# Patient Record
Sex: MALE | Race: WHITE | NOT HISPANIC OR LATINO | Employment: OTHER | URBAN - METROPOLITAN AREA
[De-identification: names, ages, dates, MRNs, and addresses within clinical notes are randomized per-mention and may not be internally consistent; named-entity substitution may affect disease eponyms.]

---

## 2018-01-23 ENCOUNTER — OFFICE VISIT (OUTPATIENT)
Dept: OPHTHALMOLOGY | Facility: CLINIC | Age: 83
End: 2018-01-23
Payer: MEDICARE

## 2018-01-23 DIAGNOSIS — H02.883 MEIBOMIAN GLAND DYSFUNCTION (MGD) OF BOTH EYES: ICD-10-CM

## 2018-01-23 DIAGNOSIS — H52.7 REFRACTIVE ERROR: ICD-10-CM

## 2018-01-23 DIAGNOSIS — H02.886 MEIBOMIAN GLAND DYSFUNCTION (MGD) OF BOTH EYES: ICD-10-CM

## 2018-01-23 DIAGNOSIS — Z96.1 PSEUDOPHAKIA, BOTH EYES: ICD-10-CM

## 2018-01-23 DIAGNOSIS — H26.493 POSTERIOR CAPSULAR OPACIFICATION VISUALLY SIGNIFICANT OF BOTH EYES: Primary | ICD-10-CM

## 2018-01-23 PROCEDURE — 92014 COMPRE OPH EXAM EST PT 1/>: CPT | Mod: S$PBB,,, | Performed by: OPHTHALMOLOGY

## 2018-01-23 PROCEDURE — 99999 PR PBB SHADOW E&M-EST. PATIENT-LVL II: CPT | Mod: PBBFAC,,, | Performed by: OPHTHALMOLOGY

## 2018-01-23 PROCEDURE — 99212 OFFICE O/P EST SF 10 MIN: CPT | Mod: PBBFAC,PO | Performed by: OPHTHALMOLOGY

## 2018-01-23 RX ORDER — NEOMYCIN SULFATE, POLYMYXIN B SULFATE, AND DEXAMETHASONE 3.5; 10000; 1 MG/G; [USP'U]/G; MG/G
OINTMENT OPHTHALMIC NIGHTLY
Qty: 3.5 G | Refills: 0 | Status: SHIPPED | OUTPATIENT
Start: 2018-01-23 | End: 2018-04-17 | Stop reason: ALTCHOICE

## 2018-01-23 RX ORDER — LEVOTHYROXINE SODIUM 25 UG/1
25 TABLET ORAL
COMMUNITY
Start: 2017-12-31

## 2018-01-23 RX ORDER — DOXYCYCLINE 100 MG/1
100 CAPSULE ORAL 2 TIMES DAILY
Qty: 60 CAPSULE | Refills: 11 | Status: SHIPPED | OUTPATIENT
Start: 2018-01-23 | End: 2018-03-09 | Stop reason: ALTCHOICE

## 2018-01-23 RX ORDER — PREDNISOLONE ACETATE 10 MG/ML
1 SUSPENSION/ DROPS OPHTHALMIC 4 TIMES DAILY
Qty: 5 ML | Refills: 0 | Status: SHIPPED | OUTPATIENT
Start: 2018-01-23 | End: 2018-04-17 | Stop reason: ALTCHOICE

## 2018-01-23 NOTE — PATIENT INSTRUCTIONS
What Is YAG Capsulotomy?  YAG capsulotomy is a laser eye treatment. It is used to improve your vision after cataract surgery. Your vision can become cloudy again after cataract surgery. This is sometimes called an after cataract. But it isnt a new cataract. Instead, your posterior capsule, which holds the lens in place, becomes cloudy. Your eye doctor may use YAG capsulotomy to help you see better.      Your eye after cataract surgery  When your cataract is removed, your eyes cloudy lens is replaced with a plastic lens called an IOL (intraocular lens). The IOL is placed in the posterior capsule, which held the old cloudy lens. You can see again because the IOL lets light reach your retina. The retina is a tissue layer that lines the back of your eye.  If the capsule becomes cloudy  The posterior capsule is a thin, clear film. It can become cloudy. This can happen months or even years after cataract surgery. This may block light from reaching your retina.  Date Last Reviewed: 6/13/2015  © 6497-5973 [employer] [address] [place] [address]. All rights reserved. This information is not intended as a substitute for professional medical care. Always follow your healthcare professional's instructions.        YAG Capsulotomy: How a YAG Laser Works    A laser is a strong beam of energy that can be focused on a tiny point. A laser can be precisely controlled. This makes it safe and reliable.  The YAG laser  For a capsulotomy, your eye doctor uses a YAG laser. The YAG laser gives off fast, tiny bursts of energy. A special contact lens may be used to help focus the laser on the right spot. The laser passes through the front of your eye. It also passes through your new IOL (intraocular lens). It doesnt harm them. When the laser reaches your posterior capsule, it makes a tiny opening. Light can then enter your eye again. Your posterior capsule is still able to hold your IOL in place.     Date  Last Reviewed: 6/13/2015  © 7457-3310 [employer]. [address] [place] [address], [place] [address]. All rights reserved. This information is not intended as a substitute for professional medical care. Always follow your healthcare professional's instructions.        YAG Capsulotomy: Your Experience    YAG capsulotomy is done in your eye doctors office or at an outpatient surgery center. The treatment is usually quick and painless. You can most often return to your normal routine right away. There are no needles or stitches. There is no risk of infection. Your vision will most likely be fully restored soon after treatment.  Possible risks  Laser treatment is safe. But all procedures have some risks. The risks of this treatment include:  · Your eye pressure may rise, usually only for a short time.  · The laser can scratch your IOL. But this almost never affects vision.  · In rare cases, the retina may become detached.   Before capsulotomy  Ask your eye doctor if you need to have someone drive you to and from your treatment. Most people see clearly again right away and go home shortly after treatment.  During capsulotomy  First, your eye is numbed and your pupil is widened (dilated) with eye drops. Then, you rest your chin on a  front of the laser machine. You may see flashes of light and hear a faint clicking sound as the laser enters your eye. But you should not feel any pain. You can help by staying relaxed and still.  After capsulotomy  You should begin to see better in a few hours. Your eye doctor may check your eye pressure later that day or the next. He or she may also give you eye drops or an ointment. Once the posterior capsule (the part of the eye that holds the lens in place) has been opened, it wont make your vision cloudy again.  Call your eye doctor right away if you have any of the following:  · Increased pain  · Sudden decrease in vision  · Increased flashing lights or floaters  · A  shadow covering your vision   Date Last Reviewed: 6/13/2015  © 1766-0187 [employer] [address] [place] [address], [place] [address]. All rights reserved. This information is not intended as a substitute for professional medical care. Always follow your healthcare professional's instructions.
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
## 2018-01-29 ENCOUNTER — TELEPHONE (OUTPATIENT)
Dept: OPHTHALMOLOGY | Facility: CLINIC | Age: 83
End: 2018-01-29

## 2018-01-29 NOTE — TELEPHONE ENCOUNTER
----- Message from Casie Valenzuela sent at 1/29/2018 10:14 AM CST -----  Contact: self [phone]  He is calling to let you know that his vision is a little blurry this morning.  Please call him with advice.  Thank you!

## 2018-02-22 ENCOUNTER — PROCEDURE VISIT (OUTPATIENT)
Dept: OPHTHALMOLOGY | Facility: CLINIC | Age: 83
End: 2018-02-22
Attending: OPHTHALMOLOGY
Payer: MEDICARE

## 2018-02-22 DIAGNOSIS — H26.493 POSTERIOR CAPSULAR OPACIFICATION VISUALLY SIGNIFICANT OF BOTH EYES: ICD-10-CM

## 2018-02-22 DIAGNOSIS — Z96.1 PSEUDOPHAKIA, BOTH EYES: ICD-10-CM

## 2018-02-22 DIAGNOSIS — H02.886 MEIBOMIAN GLAND DYSFUNCTION (MGD) OF BOTH EYES: Primary | ICD-10-CM

## 2018-02-22 DIAGNOSIS — H02.883 MEIBOMIAN GLAND DYSFUNCTION (MGD) OF BOTH EYES: Primary | ICD-10-CM

## 2018-02-22 DIAGNOSIS — H52.7 REFRACTIVE ERROR: ICD-10-CM

## 2018-02-22 PROCEDURE — 66821 AFTER CATARACT LASER SURGERY: CPT | Mod: PBBFAC,PO,LT | Performed by: OPHTHALMOLOGY

## 2018-02-22 PROCEDURE — 92012 INTRM OPH EXAM EST PATIENT: CPT | Mod: 57,S$PBB,, | Performed by: OPHTHALMOLOGY

## 2018-02-22 RX ORDER — MULTIVITAMIN
1 TABLET ORAL DAILY
COMMUNITY

## 2018-02-22 NOTE — PROGRESS NOTES
"HPI     Blurred Vision    Additional comments: yag cap OS today//           Comments   yag cap OS today//    Agree with above. States he was glad to get rid of the salve for eyelids -   was interfering with reading. Doing "best he can" with warm compresses and   lid hygiene.        Last edited by Rachele Villalobos MD on 2/22/2018  3:03 PM.   (History)            Assessment /Plan     For exam results, see Encounter Report.    Meibomian gland dysfunction (MGD) of both eyes    Posterior capsular opacification visually significant of both eyes  -     Yag Capsulotomy - OS - Left Eye    Pseudophakia, both eyes    Refractive error            Posterior capsular opacification visually significant of both eyes - OS>OD. YAG capsulotomy done today OS 2/22/18. Prednisolone QID x 5 days then d/c. RTC 1 week IOP check.    Meibomian gland dysfunction (MGD) of both eyes - with some telangiectasias, possibly rosacea component. Will start Doxycycline BID PO x 2 weeks, then reduce ton once daily until next visit. Also resume lid hygiene with warm compresses BID and add Maxitrol sharon QHS OU. Will try to express more material next visit - very thick secretions and skin changes would not allow much to come out today with cotton tip applicators.    Pseudophakia, both eyes - stable OU    Refractive error - unable to improve VA with MRx.                         "

## 2018-03-09 ENCOUNTER — OFFICE VISIT (OUTPATIENT)
Dept: OPHTHALMOLOGY | Facility: CLINIC | Age: 83
End: 2018-03-09
Payer: MEDICARE

## 2018-03-09 DIAGNOSIS — H02.886 MEIBOMIAN GLAND DYSFUNCTION (MGD) OF BOTH EYES: ICD-10-CM

## 2018-03-09 DIAGNOSIS — H26.493 POSTERIOR CAPSULAR OPACIFICATION VISUALLY SIGNIFICANT OF BOTH EYES: ICD-10-CM

## 2018-03-09 DIAGNOSIS — H02.132 SENILE ECTROPION OF RIGHT LOWER EYELID: ICD-10-CM

## 2018-03-09 DIAGNOSIS — H02.59 FLOPPY EYELID SYNDROME: ICD-10-CM

## 2018-03-09 DIAGNOSIS — Z96.1 PSEUDOPHAKIA, BOTH EYES: ICD-10-CM

## 2018-03-09 DIAGNOSIS — Z98.890 POST-OPERATIVE STATE: Primary | ICD-10-CM

## 2018-03-09 DIAGNOSIS — H02.883 MEIBOMIAN GLAND DYSFUNCTION (MGD) OF BOTH EYES: ICD-10-CM

## 2018-03-09 DIAGNOSIS — H52.7 REFRACTIVE ERROR: ICD-10-CM

## 2018-03-09 PROCEDURE — 99999 PR PBB SHADOW E&M-EST. PATIENT-LVL III: CPT | Mod: PBBFAC,,, | Performed by: OPHTHALMOLOGY

## 2018-03-09 PROCEDURE — 99213 OFFICE O/P EST LOW 20 MIN: CPT | Mod: PBBFAC,PO | Performed by: OPHTHALMOLOGY

## 2018-03-09 PROCEDURE — 99024 POSTOP FOLLOW-UP VISIT: CPT | Mod: POP,,, | Performed by: OPHTHALMOLOGY

## 2018-03-09 NOTE — PROGRESS NOTES
HPI     Post-op Evaluation    Additional comments: os           Comments   2 week post Yag Cap Left Eye, denies eye pain no new floaters or flashes.   States he did not start gtts until a day or 2 after the laser he forgot   about them, he is still using the Pred QID left eye    Agree with above. Notices improvement in OS when he covers other eye.       Last edited by Rachele Villalobos MD on 3/9/2018 10:48 AM.   (History)            Assessment /Plan     For exam results, see Encounter Report.    Post-operative state    Posterior capsular opacification visually significant of both eyes    Meibomian gland dysfunction (MGD) of both eyes    Pseudophakia, both eyes    Senile ectropion of right lower eyelid    Floppy eyelid syndrome    Refractive error    Other orders  -     flaxseed 1,000 mg Cap; Take 1-2 capsules by mouth 3 (three) times daily.            Posterior capsular opacification visually significant of both eyes - OS>OD. S/p YAG capsulotomy OS 2/22/18. D/c Prednisolone. RTC for YAG cap OD.    Meibomian gland dysfunction (MGD) of both eyes - with some telangiectasias, possibly rosacea component. Completed Doxycycline BID PO x 2 weeks, will now have him take PO Flaxseed oil TID. Continue lid hygiene with warm compresses BID and Maxitrol sharon QHS OU. Expressed more material today - skin changes still seem to block some meibomian orificies.    Pseudophakia, both eyes - stable OU    Floppy eyelids OU, RLL ectropion - observe for now.    Refractive error - unable to improve VA with MRx.

## 2018-04-12 ENCOUNTER — PROCEDURE VISIT (OUTPATIENT)
Dept: OPHTHALMOLOGY | Facility: CLINIC | Age: 83
End: 2018-04-12
Payer: MEDICARE

## 2018-04-12 DIAGNOSIS — H26.493 POSTERIOR CAPSULAR OPACIFICATION VISUALLY SIGNIFICANT OF BOTH EYES: ICD-10-CM

## 2018-04-12 PROCEDURE — 66821 AFTER CATARACT LASER SURGERY: CPT | Mod: PBBFAC,PO,RT | Performed by: OPHTHALMOLOGY

## 2018-04-12 NOTE — PROGRESS NOTES
HPI     Here for Yag Cap Right eye, denies eye pain no new eye problems with   either eye.     Last edited by Adriana العلي on 4/12/2018  1:25 PM. (History)            Assessment /Plan     For exam results, see Encounter Report.    Posterior capsular opacification visually significant of both eyes  -     Yag Capsulotomy - OD - Right Eye            Posterior capsular opacification visually significant of both eyes - OS>OD. S/p YAG capsulotomy OS 2/22/18. YAG capsulotomy done today OD. Prednisolone QID x 5 days then d/c. RTC 1 week IOP check, follow up lids, MRx.    Meibomian gland dysfunction (MGD) of both eyes - with some telangiectasias, possibly rosacea component. Completed Doxycycline BID PO x 2 weeks, will now have him take PO Flaxseed oil TID. Continue lid hygiene with warm compresses BID and Maxitrol sharon QHS OU. Skin changes still seem to block some meibomian orificies.    Pseudophakia, both eyes - stable OU    Floppy eyelids OU, RLL ectropion - observe for now.    Refractive error - re-check next visit.

## 2018-04-17 ENCOUNTER — OFFICE VISIT (OUTPATIENT)
Dept: OPHTHALMOLOGY | Facility: CLINIC | Age: 83
End: 2018-04-17
Payer: MEDICARE

## 2018-04-17 DIAGNOSIS — H26.493 POSTERIOR CAPSULAR OPACIFICATION VISUALLY SIGNIFICANT OF BOTH EYES: ICD-10-CM

## 2018-04-17 DIAGNOSIS — Z96.1 PSEUDOPHAKIA, BOTH EYES: ICD-10-CM

## 2018-04-17 DIAGNOSIS — H02.883 MEIBOMIAN GLAND DYSFUNCTION (MGD) OF BOTH EYES: ICD-10-CM

## 2018-04-17 DIAGNOSIS — H02.886 MEIBOMIAN GLAND DYSFUNCTION (MGD) OF BOTH EYES: ICD-10-CM

## 2018-04-17 DIAGNOSIS — H02.59 FLOPPY EYELID SYNDROME: ICD-10-CM

## 2018-04-17 DIAGNOSIS — Z98.890 POST-OPERATIVE STATE: Primary | ICD-10-CM

## 2018-04-17 DIAGNOSIS — H40.041 STEROID RESPONDERS TO GLAUCOMA OF RIGHT EYE: ICD-10-CM

## 2018-04-17 DIAGNOSIS — H52.7 REFRACTIVE ERROR: ICD-10-CM

## 2018-04-17 DIAGNOSIS — H02.132 SENILE ECTROPION OF RIGHT LOWER EYELID: ICD-10-CM

## 2018-04-17 PROCEDURE — 99999 PR PBB SHADOW E&M-EST. PATIENT-LVL III: CPT | Mod: PBBFAC,,, | Performed by: OPHTHALMOLOGY

## 2018-04-17 PROCEDURE — 99213 OFFICE O/P EST LOW 20 MIN: CPT | Mod: PBBFAC,PO | Performed by: OPHTHALMOLOGY

## 2018-04-17 PROCEDURE — 92012 INTRM OPH EXAM EST PATIENT: CPT | Mod: S$PBB,,, | Performed by: OPHTHALMOLOGY

## 2018-04-17 NOTE — PROGRESS NOTES
HPI     Post-op Evaluation    Additional comments: 1 wk sp yag cap OD d 4/12/2018//  today last day for   drop//           Comments   1 wk sp yag cap OD d 4/12/2018//  today last day for drop//  Pt states seeing well//       Last edited by Clary Telles on 4/17/2018  9:48 AM. (History)            Assessment /Plan     For exam results, see Encounter Report.    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 S/p YAG capsulotomy OS 2/22/18, OD 4/12/18. IOP up OD today, d/c Prednisolone - likely steroid responder. RTC to re-check IOP In 2 weeks, along with MRx.    Meibomian gland dysfunction (MGD) of both eyes - with some telangiectasias, possibly rosacea component. Completed Doxycycline BID PO x 2 weeks, will now have him take PO Flaxseed oil TID. Continue lid hygiene with warm compresses BID . Skin changes still seem to block some meibomian orificies, but this seems improved today - able to express a lot of material bilateral upper and lower lids. Continue flaxseed oil and lid hygiene indefinitely.     Pseudophakia, both eyes - stable OU    Floppy eyelids OU, RLL ectropion - observe for now. States he has already had a tightening procedure with Dr. Gardner in the past. Discussed maintaining lubrication.     Refractive error - CHECK MRX NEXT VISIT PLEASE

## 2018-05-01 ENCOUNTER — OFFICE VISIT (OUTPATIENT)
Dept: OPHTHALMOLOGY | Facility: CLINIC | Age: 83
End: 2018-05-01
Payer: MEDICARE

## 2018-05-01 DIAGNOSIS — H02.883 MEIBOMIAN GLAND DYSFUNCTION (MGD) OF BOTH EYES: ICD-10-CM

## 2018-05-01 DIAGNOSIS — H02.59 FLOPPY EYELID SYNDROME: ICD-10-CM

## 2018-05-01 DIAGNOSIS — Z96.1 PSEUDOPHAKIA, BOTH EYES: ICD-10-CM

## 2018-05-01 DIAGNOSIS — H02.132 SENILE ECTROPION OF RIGHT LOWER EYELID: ICD-10-CM

## 2018-05-01 DIAGNOSIS — H40.041 STEROID RESPONDERS TO GLAUCOMA OF RIGHT EYE: ICD-10-CM

## 2018-05-01 DIAGNOSIS — H02.886 MEIBOMIAN GLAND DYSFUNCTION (MGD) OF BOTH EYES: ICD-10-CM

## 2018-05-01 DIAGNOSIS — Z98.890 POST-OPERATIVE STATE: Primary | ICD-10-CM

## 2018-05-01 DIAGNOSIS — H40.051 OCULAR HYPERTENSION, RIGHT: ICD-10-CM

## 2018-05-01 DIAGNOSIS — H52.7 REFRACTIVE ERROR: ICD-10-CM

## 2018-05-01 PROCEDURE — 92020 GONIOSCOPY: CPT | Mod: PBBFAC,PO | Performed by: OPHTHALMOLOGY

## 2018-05-01 PROCEDURE — 99213 OFFICE O/P EST LOW 20 MIN: CPT | Mod: PBBFAC,PO,25 | Performed by: OPHTHALMOLOGY

## 2018-05-01 PROCEDURE — 99024 POSTOP FOLLOW-UP VISIT: CPT | Mod: POP,,, | Performed by: OPHTHALMOLOGY

## 2018-05-01 PROCEDURE — 92020 GONIOSCOPY: CPT | Mod: S$PBB,,, | Performed by: OPHTHALMOLOGY

## 2018-05-01 PROCEDURE — 99999 PR PBB SHADOW E&M-EST. PATIENT-LVL III: CPT | Mod: PBBFAC,,, | Performed by: OPHTHALMOLOGY

## 2018-05-01 NOTE — PROGRESS NOTES
"HPI     Post-op Evaluation    Additional comments: 3 wk sp yag cap OD 4/12/2018//  finished drops as   directed "a while back"//  MRX today//           Comments   3 wk sp yag cap OD 4/12/2018//  finished drops as directed "a while   back"//    Agree with above. Uses flonase daily.          Last edited by Rachele Villalobos MD on 5/1/2018  8:42 AM.   (History)            Assessment /Plan     For exam results, see Encounter Report.    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 May have some nasal PAS OD? No known family history of glaucoma.    Posterior capsular opacification visually significant of both eyes - OS>OD. S/p YAG capsulotomy OS 2/22/18, OD 4/12/18. IOP still up OD today off Prednisolone - likely steroid responder. He is also on Flonase - will stop. RTC to re-check IOP In 2 weeks. If still in 20's, start Brimonidine BID OD (pt has 1st degree AV block - avoid Timolol, Dorzolamide has been on backorder).     Meibomian gland dysfunction (MGD) of both eyes - with some telangiectasias, possibly rosacea component. Completed Doxycycline BID PO x 2 weeks, will now have him take PO Flaxseed oil TID. Continue lid hygiene with warm compresses BID . Skin changes still seem to block some meibomian orificies, but this seems improved today. Continue flaxseed oil and lid hygiene indefinitely.     Pseudophakia, both eyes - stable OU    Floppy eyelids OU, RLL ectropion - observe for now. States he has already had a tightening procedure with Dr. Gardner in the past. Discussed maintaining lubrication.     Refractive error - declines MRx for distance.                           "

## 2018-05-15 ENCOUNTER — OFFICE VISIT (OUTPATIENT)
Dept: OPHTHALMOLOGY | Facility: CLINIC | Age: 83
End: 2018-05-15
Payer: MEDICARE

## 2018-05-15 DIAGNOSIS — Z96.1 PSEUDOPHAKIA, BOTH EYES: ICD-10-CM

## 2018-05-15 DIAGNOSIS — H02.132 SENILE ECTROPION OF RIGHT LOWER EYELID: ICD-10-CM

## 2018-05-15 DIAGNOSIS — H02.59 FLOPPY EYELID SYNDROME: ICD-10-CM

## 2018-05-15 DIAGNOSIS — H40.051 OCULAR HYPERTENSION, RIGHT: ICD-10-CM

## 2018-05-15 DIAGNOSIS — Z98.890 POST-OPERATIVE STATE: Primary | ICD-10-CM

## 2018-05-15 DIAGNOSIS — H52.7 REFRACTIVE ERROR: ICD-10-CM

## 2018-05-15 DIAGNOSIS — H02.883 MEIBOMIAN GLAND DYSFUNCTION (MGD) OF BOTH EYES: ICD-10-CM

## 2018-05-15 DIAGNOSIS — H40.041 STEROID RESPONDERS TO GLAUCOMA OF RIGHT EYE: ICD-10-CM

## 2018-05-15 DIAGNOSIS — H02.886 MEIBOMIAN GLAND DYSFUNCTION (MGD) OF BOTH EYES: ICD-10-CM

## 2018-05-15 PROCEDURE — 99024 POSTOP FOLLOW-UP VISIT: CPT | Mod: POP,,, | Performed by: OPHTHALMOLOGY

## 2018-05-15 PROCEDURE — 99213 OFFICE O/P EST LOW 20 MIN: CPT | Mod: PBBFAC,PO | Performed by: OPHTHALMOLOGY

## 2018-05-15 PROCEDURE — 99999 PR PBB SHADOW E&M-EST. PATIENT-LVL III: CPT | Mod: PBBFAC,,, | Performed by: OPHTHALMOLOGY

## 2018-05-15 NOTE — PROGRESS NOTES
HPI     Glaucoma Suspect    Additional comments: sp yag cap OD d 4/12/2018 //  iop has been up OD//           Comments   sp yag cap OD d 4/12/2018 //  iop has been up OD// flonase has been   stopped//       Last edited by Clary Telles on 5/15/2018  8:08 AM. (History)            Assessment /Plan     For exam results, see Encounter Report.    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 May have some nasal PAS OD? No known family history of glaucoma.    Posterior capsular opacification visually significant of both eyes - OS>OD. S/p YAG capsulotomy OS 2/22/18, OD 4/12/18. IOP OD improved since stopping Flonase and Prednisolone but still higher than OS - likely steroid responder,also has some PAS. RTC to re-check IOP in 4 months. If still in 20's, start Brimonidine BID OD (pt has 1st degree AV block - avoid Timolol, Dorzolamide has been on backorder).     Meibomian gland dysfunction (MGD) of both eyes - with some telangiectasias, possibly rosacea component. Completed Doxycycline BID PO x 2 weeks, will now have him take PO Flaxseed oil TID. Continue lid hygiene with warm compresses BID . Skin changes still seem to block some meibomian orificies, but this seems improved today. Continue flaxseed oil and lid hygiene indefinitely.     Pseudophakia, both eyes - stable OU    Floppy eyelids OU, RLL ectropion - observe for now. States he has already had a tightening procedure with Dr. Gardner in the past. Discussed maintaining lubrication.     Refractive error - declines MRx for distance.

## 2018-06-18 ENCOUNTER — TELEPHONE (OUTPATIENT)
Dept: OPHTHALMOLOGY | Facility: CLINIC | Age: 83
End: 2018-06-18

## 2018-06-18 NOTE — TELEPHONE ENCOUNTER
Pt returned call and I explained that Dr Villalobos did not have any openings in the [facility] but offered him appt with Dr Mehta. Pt did not want to see anyone but Dr Villalobos so I scheduled him an appt on 7/2 in the [facility]. Will send appt slip in the mail.

## 2018-06-18 NOTE — TELEPHONE ENCOUNTER
----- Message from Renny Campo sent at 6/18/2018  9:50 AM CDT -----  Contact: self   Patient need to speak with a nurse has some questions, please call back at [phone] (home)

## 2018-06-18 NOTE — TELEPHONE ENCOUNTER
----- Message from Karissa Grimaldo sent at 6/18/2018  2:34 PM CDT -----  Returning your call.  Please call patient at [phone].

## 2018-06-18 NOTE — TELEPHONE ENCOUNTER
Left message for pt to return call. Spoke with him earlier and pt said he wanted to see Dr Villalobos in [place] before she left that clinic due to blurred va OD. After checking the schedule I left message to let pt know that Dr Villalobos did not any openings at all between now and 7/3/18 her last day in [place]. Offered to schedule him in [place] or to see Dr Mehta in [place].

## 2018-06-29 ENCOUNTER — TELEPHONE (OUTPATIENT)
Dept: OPHTHALMOLOGY | Facility: CLINIC | Age: 83
End: 2018-06-29

## 2018-09-04 ENCOUNTER — TELEPHONE (OUTPATIENT)
Dept: OPHTHALMOLOGY | Facility: CLINIC | Age: 83
End: 2018-09-04

## 2018-09-04 NOTE — TELEPHONE ENCOUNTER
----- Message from Zeinab Cevallos sent at 9/3/2018 10:34 AM CDT -----  Contact: self  Pt is calling in regards to rescheduling his appt for tomorrow. Pt would like a call back to reschedule.    Pt can be reached at [phone].    Thank you

## 2018-09-25 ENCOUNTER — OFFICE VISIT (OUTPATIENT)
Dept: OPHTHALMOLOGY | Facility: CLINIC | Age: 83
End: 2018-09-25
Payer: MEDICARE

## 2018-09-25 DIAGNOSIS — H40.041 STEROID RESPONDERS TO GLAUCOMA OF RIGHT EYE: ICD-10-CM

## 2018-09-25 DIAGNOSIS — H52.7 REFRACTIVE ERROR: ICD-10-CM

## 2018-09-25 DIAGNOSIS — Z96.1 PSEUDOPHAKIA, BOTH EYES: ICD-10-CM

## 2018-09-25 DIAGNOSIS — H02.132 SENILE ECTROPION OF RIGHT LOWER EYELID: ICD-10-CM

## 2018-09-25 DIAGNOSIS — H02.886 MEIBOMIAN GLAND DYSFUNCTION (MGD) OF BOTH EYES: ICD-10-CM

## 2018-09-25 DIAGNOSIS — H02.59 FLOPPY EYELID SYNDROME: ICD-10-CM

## 2018-09-25 DIAGNOSIS — H02.883 MEIBOMIAN GLAND DYSFUNCTION (MGD) OF BOTH EYES: ICD-10-CM

## 2018-09-25 DIAGNOSIS — H40.051 OCULAR HYPERTENSION, RIGHT: Primary | ICD-10-CM

## 2018-09-25 PROCEDURE — 99213 OFFICE O/P EST LOW 20 MIN: CPT | Mod: PBBFAC,PO | Performed by: OPHTHALMOLOGY

## 2018-09-25 PROCEDURE — 99999 PR PBB SHADOW E&M-EST. PATIENT-LVL III: CPT | Mod: PBBFAC,,, | Performed by: OPHTHALMOLOGY

## 2018-09-25 PROCEDURE — 92012 INTRM OPH EXAM EST PATIENT: CPT | Mod: S$PBB,,, | Performed by: OPHTHALMOLOGY

## 2018-09-25 RX ORDER — BRIMONIDINE TARTRATE 2 MG/ML
1 SOLUTION/ DROPS OPHTHALMIC 2 TIMES DAILY
Qty: 5 ML | Refills: 11 | Status: SHIPPED | OUTPATIENT
Start: 2018-09-25 | End: 2020-09-09

## 2018-09-25 NOTE — PATIENT INSTRUCTIONS
I recommend that you use artificial tears 3-4 times daily. Recommended brands include Systane, Refresh, Genteal, and Thera-tears. Non-preserved formulas will be gentler - they will come in small individual vials instead of a bottle.   Also use over-the-counter eye lubricating ointment at bedtime.

## 2018-09-25 NOTE — PROGRESS NOTES
HPI     Follow-up      Additional comments: IOP check.               Comments     86 YO male presents today for an IOP check. He states he is doing well,   no problems or complaints. He does notice some fuzziness in vision   particularly when reading.            Last edited by Gabriela Weaver, PCT on 9/25/2018 10:28 AM. (History)            Assessment /Plan     For exam results, see Encounter Report.    Ocular hypertension, right  -     brimonidine 0.2% (ALPHAGAN) 0.2 % Drop; Place 1 drop into the right eye 2 (two) times daily.  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 May have some nasal PAS OD? No known family history of glaucoma. IOP 20 today off steroids OD. Start Brimonidine BID OD today 9/225/18 (pt has 1st degree AV block - avoid Timolol, Dorzolamide had been on backorder). RTC 3 months for IOP check and OCT nerve.    Posterior capsular opacification visually significant of both eyes - OS>OD. S/p YAG capsulotomy OS 2/22/18, OD 4/12/18. IOP OD improved since stopping Flonase and Prednisolone but still higher than OS - likely steroid responder,also has some PAS.     Meibomian gland dysfunction (MGD) of both eyes - with some telangiectasias, possibly rosacea component. Completed Doxycycline BID PO x 2 weeks, will now have him take PO Flaxseed oil TID. Continue lid hygiene with warm compresses BID . Skin changes still seem to block some meibomian orificies, but this seems improved today. Continue flaxseed oil and lid hygiene indefinitely. I recommend that you use artificial tears 3-4 times daily. Recommended brands include Systane,  Refresh, Genteal, and Thera-tears. Non-preserved formulas will be gentler - they will come in small individual vials instead of a bottle. Also recommend nighttime sharon QHS.    Pseudophakia, both eyes - stable OU    Floppy eyelids OU, RLL ectropion - observe for now. States he has already had a tightening procedure with Dr. Gardner in the past. Discussed maintaining lubrication, again. Will place referral to see Dr. Landis for 2nd opinion - getting many cysts, concretions.     Refractive error - declines MRx for distance.

## 2018-12-20 ENCOUNTER — INITIAL CONSULT (OUTPATIENT)
Dept: OPHTHALMOLOGY | Facility: CLINIC | Age: 83
End: 2018-12-20
Payer: MEDICARE

## 2018-12-20 DIAGNOSIS — H02.59 FLOPPY EYELID SYNDROME: ICD-10-CM

## 2018-12-20 DIAGNOSIS — H40.051 OCULAR HYPERTENSION, RIGHT: ICD-10-CM

## 2018-12-20 DIAGNOSIS — H02.102 ECTROPION OF RIGHT LOWER EYELID, UNSPECIFIED ECTROPION TYPE: Primary | ICD-10-CM

## 2018-12-20 DIAGNOSIS — H02.886 MEIBOMIAN GLAND DYSFUNCTION (MGD) OF BOTH EYES: ICD-10-CM

## 2018-12-20 DIAGNOSIS — H40.041 STEROID RESPONDERS TO GLAUCOMA OF RIGHT EYE: ICD-10-CM

## 2018-12-20 DIAGNOSIS — H02.883 MEIBOMIAN GLAND DYSFUNCTION (MGD) OF BOTH EYES: ICD-10-CM

## 2018-12-20 DIAGNOSIS — Z96.1 PSEUDOPHAKIA, BOTH EYES: ICD-10-CM

## 2018-12-20 PROCEDURE — 92012 INTRM OPH EXAM EST PATIENT: CPT | Mod: S$PBB,,, | Performed by: OPHTHALMOLOGY

## 2018-12-20 PROCEDURE — 92285 EXTERNAL OCULAR PHOTOGRAPHY: CPT | Mod: PBBFAC | Performed by: OPHTHALMOLOGY

## 2018-12-20 NOTE — PROGRESS NOTES
Assessment /Plan     For exam results, see Encounter Report.    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 Gardner.    Patient is unsure why he is here today.  He states he has chronic tearing and irritation of the right eye.     The patient has a right lower eyelid ectropion.     Discussed that he would need surgical intervention.  The patient prefers to return to Dr. Gardner for his surgery.    Continue current management for meibomian gland dysfunction.    Return as needed

## 2018-12-20 NOTE — LETTER
December 20, 2018      Rachele Villalobos MD  [address] [place] [address] [address]  [address]  [facility] [address]           [facility] - Ophthalmology  [address]  [facility] [address]  Phone: [phone]  Fax: [phone]          Patient: Williams Gonzalez   MR Number: [id number]   YOB: 1933   Date of Visit: 12/20/2018       Dear Dr. Rachele Villalobos:    Thank you for referring Williams Gonzalez to me for evaluation. Attached you will find relevant portions of my assessment and plan of care.    If you have questions, please do not hesitate to call me. I look forward to following Williams Gonzalez along with you.    Sincerely,    Kavitha Landis MD    Enclosure  CC:  No Recipients    If you would like to receive this communication electronically, please contact [email or web address] or [phone] to request more information on [employer] access.    For providers and/or their staff who would like to refer a patient to [facility], please contact us through our one-stop-shop provider referral line, [facility], at [phone].    If you feel you have received this communication in error or would no longer like to receive these types of communications, please e-mail [email or web address]

## 2020-09-09 ENCOUNTER — LAB VISIT (OUTPATIENT)
Dept: URGENT CARE | Facility: CLINIC | Age: 85
End: 2020-09-09
Payer: MEDICARE

## 2020-09-09 DIAGNOSIS — Z01.818 PREOPERATIVE EXAMINATION: ICD-10-CM

## 2020-09-09 PROCEDURE — U0003 INFECTIOUS AGENT DETECTION BY NUCLEIC ACID (DNA OR RNA); SEVERE ACUTE RESPIRATORY SYNDROME CORONAVIRUS 2 (SARS-COV-2) (CORONAVIRUS DISEASE [COVID-19]), AMPLIFIED PROBE TECHNIQUE, MAKING USE OF HIGH THROUGHPUT TECHNOLOGIES AS DESCRIBED BY CMS-2020-01-R: HCPCS

## 2020-09-10 LAB — SARS-COV-2 RNA RESP QL NAA+PROBE: NOT DETECTED

## 2020-09-11 PROBLEM — I20.0 UNSTABLE ANGINA: Status: ACTIVE | Noted: 2020-09-11

## 2021-01-14 ENCOUNTER — IMMUNIZATION (OUTPATIENT)
Dept: PRIMARY CARE CLINIC | Facility: CLINIC | Age: 86
End: 2021-01-14
Payer: MEDICARE

## 2021-01-14 DIAGNOSIS — Z23 NEED FOR VACCINATION: ICD-10-CM

## 2021-01-14 PROCEDURE — 0001A COVID-19, MRNA, LNP-S, PF, 30 MCG/0.3 ML DOSE VACCINE: CPT | Mod: S$GLB,,, | Performed by: FAMILY MEDICINE

## 2021-01-14 PROCEDURE — 91300 COVID-19, MRNA, LNP-S, PF, 30 MCG/0.3 ML DOSE VACCINE: ICD-10-PCS | Mod: S$GLB,,, | Performed by: FAMILY MEDICINE

## 2021-01-14 PROCEDURE — 0001A COVID-19, MRNA, LNP-S, PF, 30 MCG/0.3 ML DOSE VACCINE: ICD-10-PCS | Mod: S$GLB,,, | Performed by: FAMILY MEDICINE

## 2021-01-14 PROCEDURE — 91300 COVID-19, MRNA, LNP-S, PF, 30 MCG/0.3 ML DOSE VACCINE: CPT | Mod: S$GLB,,, | Performed by: FAMILY MEDICINE

## 2021-02-04 ENCOUNTER — IMMUNIZATION (OUTPATIENT)
Dept: PRIMARY CARE CLINIC | Facility: CLINIC | Age: 86
End: 2021-02-04
Payer: MEDICARE

## 2021-02-04 DIAGNOSIS — Z23 NEED FOR VACCINATION: Primary | ICD-10-CM

## 2021-02-04 PROCEDURE — 91300 COVID-19, MRNA, LNP-S, PF, 30 MCG/0.3 ML DOSE VACCINE: ICD-10-PCS | Mod: S$GLB,,, | Performed by: FAMILY MEDICINE

## 2021-02-04 PROCEDURE — 0002A COVID-19, MRNA, LNP-S, PF, 30 MCG/0.3 ML DOSE VACCINE: ICD-10-PCS | Mod: S$GLB,,, | Performed by: FAMILY MEDICINE

## 2021-02-04 PROCEDURE — 91300 COVID-19, MRNA, LNP-S, PF, 30 MCG/0.3 ML DOSE VACCINE: CPT | Mod: S$GLB,,, | Performed by: FAMILY MEDICINE

## 2021-02-04 PROCEDURE — 0002A COVID-19, MRNA, LNP-S, PF, 30 MCG/0.3 ML DOSE VACCINE: CPT | Mod: S$GLB,,, | Performed by: FAMILY MEDICINE

## 2021-05-11 PROBLEM — I48.91 ATRIAL FIBRILLATION: Status: ACTIVE | Noted: 2021-05-11

## 2021-10-19 ENCOUNTER — IMMUNIZATION (OUTPATIENT)
Dept: FAMILY MEDICINE | Facility: CLINIC | Age: 86
End: 2021-10-19
Payer: COMMERCIAL

## 2021-10-19 DIAGNOSIS — Z23 NEED FOR VACCINATION: Primary | ICD-10-CM

## 2021-10-19 PROCEDURE — 0003A COVID-19, MRNA, LNP-S, PF, 30 MCG/0.3 ML DOSE VACCINE: CPT | Mod: PBBFAC | Performed by: FAMILY MEDICINE

## 2021-10-19 PROCEDURE — 91300 COVID-19, MRNA, LNP-S, PF, 30 MCG/0.3 ML DOSE VACCINE: CPT | Mod: PBBFAC | Performed by: FAMILY MEDICINE

## 2022-05-23 ENCOUNTER — IMMUNIZATION (OUTPATIENT)
Dept: FAMILY MEDICINE | Facility: CLINIC | Age: 87
End: 2022-05-23
Payer: MEDICARE

## 2022-05-23 DIAGNOSIS — Z23 NEED FOR VACCINATION: Primary | ICD-10-CM

## 2022-05-23 PROCEDURE — 91305 COVID-19, MRNA, LNP-S, PF, 30 MCG/0.3 ML DOSE VACCINE (PFIZER): CPT | Mod: PBBFAC | Performed by: FAMILY MEDICINE

## 2022-12-27 ENCOUNTER — OFFICE VISIT (OUTPATIENT)
Dept: ORTHOPEDICS | Facility: CLINIC | Age: 87
End: 2022-12-27
Payer: MEDICARE

## 2022-12-27 ENCOUNTER — HOSPITAL ENCOUNTER (OUTPATIENT)
Dept: RADIOLOGY | Facility: HOSPITAL | Age: 87
Discharge: HOME | End: 2022-12-27
Attending: ORTHOPAEDIC SURGERY
Payer: MEDICARE

## 2022-12-27 VITALS — HEIGHT: 69 IN | BODY MASS INDEX: 28.14 KG/M2 | WEIGHT: 190 LBS

## 2022-12-27 DIAGNOSIS — S82.62XA DISPLACED FRACTURE OF LATERAL MALLEOLUS OF LEFT FIBULA, INITIAL ENCOUNTER FOR CLOSED FRACTURE: Primary | ICD-10-CM

## 2022-12-27 DIAGNOSIS — M25.572 ACUTE LEFT ANKLE PAIN: ICD-10-CM

## 2022-12-27 DIAGNOSIS — M25.572 ACUTE LEFT ANKLE PAIN: Primary | ICD-10-CM

## 2022-12-27 PROCEDURE — 1125F PR PAIN SEVERITY QUANTIFIED, PAIN PRESENT: ICD-10-PCS | Mod: CPTII,S$GLB,, | Performed by: ORTHOPAEDIC SURGERY

## 2022-12-27 PROCEDURE — 1159F MED LIST DOCD IN RCRD: CPT | Mod: CPTII,S$GLB,, | Performed by: ORTHOPAEDIC SURGERY

## 2022-12-27 PROCEDURE — 99203 PR OFFICE/OUTPT VISIT, NEW, LEVL III, 30-44 MIN: ICD-10-PCS | Mod: S$GLB,,, | Performed by: ORTHOPAEDIC SURGERY

## 2022-12-27 PROCEDURE — 1160F PR REVIEW ALL MEDS BY PRESCRIBER/CLIN PHARMACIST DOCUMENTED: ICD-10-PCS | Mod: CPTII,S$GLB,, | Performed by: ORTHOPAEDIC SURGERY

## 2022-12-27 PROCEDURE — 3288F FALL RISK ASSESSMENT DOCD: CPT | Mod: CPTII,S$GLB,, | Performed by: ORTHOPAEDIC SURGERY

## 2022-12-27 PROCEDURE — 73610 X-RAY EXAM OF ANKLE: CPT | Mod: TC,PO,LT

## 2022-12-27 PROCEDURE — 99999 PR PBB SHADOW E&M-EST. PATIENT-LVL III: ICD-10-PCS | Mod: PBBFAC,,, | Performed by: ORTHOPAEDIC SURGERY

## 2022-12-27 PROCEDURE — 99203 OFFICE O/P NEW LOW 30 MIN: CPT | Mod: S$GLB,,, | Performed by: ORTHOPAEDIC SURGERY

## 2022-12-27 PROCEDURE — 3288F PR FALLS RISK ASSESSMENT DOCUMENTED: ICD-10-PCS | Mod: CPTII,S$GLB,, | Performed by: ORTHOPAEDIC SURGERY

## 2022-12-27 PROCEDURE — 1159F PR MEDICATION LIST DOCUMENTED IN MEDICAL RECORD: ICD-10-PCS | Mod: CPTII,S$GLB,, | Performed by: ORTHOPAEDIC SURGERY

## 2022-12-27 PROCEDURE — 1100F PTFALLS ASSESS-DOCD GE2>/YR: CPT | Mod: CPTII,S$GLB,, | Performed by: ORTHOPAEDIC SURGERY

## 2022-12-27 PROCEDURE — 73610 XR ANKLE COMPLETE 3 VIEW LEFT: ICD-10-PCS | Mod: 26,LT,, | Performed by: RADIOLOGY

## 2022-12-27 PROCEDURE — 73610 X-RAY EXAM OF ANKLE: CPT | Mod: 26,LT,, | Performed by: RADIOLOGY

## 2022-12-27 PROCEDURE — 1100F PR PT FALLS ASSESS DOC 2+ FALLS/FALL W/INJURY/YR: ICD-10-PCS | Mod: CPTII,S$GLB,, | Performed by: ORTHOPAEDIC SURGERY

## 2022-12-27 PROCEDURE — 1125F AMNT PAIN NOTED PAIN PRSNT: CPT | Mod: CPTII,S$GLB,, | Performed by: ORTHOPAEDIC SURGERY

## 2022-12-27 PROCEDURE — 99999 PR PBB SHADOW E&M-EST. PATIENT-LVL III: CPT | Mod: PBBFAC,,, | Performed by: ORTHOPAEDIC SURGERY

## 2022-12-27 PROCEDURE — 1160F RVW MEDS BY RX/DR IN RCRD: CPT | Mod: CPTII,S$GLB,, | Performed by: ORTHOPAEDIC SURGERY

## 2022-12-27 NOTE — PROGRESS NOTES
Status/Diagnosis: Minimally displaced Left lateral malleolus fracture  Date of Surgery: none  Date of Injury: 12/19/2022  Return visit: 1 month  X-rays on Return: WB 3-views Left ankle    Chief Complaint:   Chief Complaint   Patient presents with    Left Ankle - Pain, Injury, Fall     Present History:  Williams Gonzalez Jr. is a 89 y.o. male who presents today for new patient evaluation.  Sustained a ground level fall on 12/19.  Immediate pain and swelling however still able to bear weight.  Not seen in the ED until 12/24 at which time x-rays were taken and showed a fracture of the lateral malleolus.  He was given a boot and walker.  He continues to be weight bear as tolerated.  Moderate residual pain on presentation today.  Denies any pain or instability prior to injury.  Presents today for outpatient follow-up.  PMH significant for Afib on Xarelto.      Past Medical History:   Diagnosis Date    Anticoagulant long-term use     Cataract     OU done//    Hypertension     Melanoma in situ of right upper arm 2018    PAF (paroxysmal atrial fibrillation)     Prostate cancer 07/1999    Sleep apnea     does not use CPAP        Past Surgical History:   Procedure Laterality Date    APPENDECTOMY      CARDIOVERSION      CARDIOVERSION N/A 12/5/2022    Procedure: CARDIOVERSION;  Surgeon: Angel Choe MD;  Location: [facility];  Service: Cardiology;  Laterality: N/A;    CAROTID ARTERY ANGIOPLASTY      left    CATARACT EXTRACTION Left 10/23/2014    CATARACT EXTRACTION W/  INTRAOCULAR LENS IMPLANT Right 12/10/2014    COLONOSCOPY      CORONARY ANGIOGRAPHY N/A 09/11/2020    Procedure: ANGIOGRAM, CORONARY ARTERY;  Surgeon: Kia Fong MD;  Location: [facility] CATH;  Service: Cardiology;  Laterality: N/A;    ECTROPION REPAIR Bilateral     ou    LEFT HEART CATHETERIZATION Left 09/11/2020    Procedure: Left heart cath;  Surgeon: Kia Fong MD;  Location: [facility] CATH;  Service: Cardiology;  Laterality: Left;    NASAL SEPTUM SURGERY       VASECTOMY      sedation    yag cap Bilateral      OU done//       Current Outpatient Medications   Medication Sig    allopurinol (ZYLOPRIM) 300 MG tablet Take 300 mg by mouth once daily.    candesartan (ATACAND) 32 MG tablet Take 32 mg by mouth every evening.    gabapentin (NEURONTIN) 600 MG tablet Take 600 mg by mouth every evening.    HYDROcodone-acetaminophen (NORCO) 5-325 mg per tablet Take 1 tablet by mouth every 4 (four) hours as needed.    levothyroxine (SYNTHROID) 25 MCG tablet Take 25 mcg by mouth before breakfast.     multivitamin (THERAGRAN) per tablet Take 1 tablet by mouth once daily.     pantoprazole (PROTONIX) 40 MG tablet Take 1 tablet (40 mg total) by mouth 2 (two) times daily before meals.    pravastatin (PRAVACHOL) 40 MG tablet TAKE 1 TABLET BY MOUTH ONCE DAILY    rivaroxaban (XARELTO) 20 mg Tab Take 20 mg by mouth daily with dinner or evening meal.    sotaloL (BETAPACE) 80 MG tablet TAKE 1 TABLET BY MOUTH  TWICE DAILY (Patient taking differently: Take 120 mg by mouth 2 (two) times daily.)     No current facility-administered medications for this visit.       Review of patient's allergies indicates:   Allergen Reactions    Codeine Itching    Irbesartan     Lotrel [amlodipine-benazepril]        Family History   Problem Relation Age of Onset    Hypertension Brother     Cancer Brother     Hypertension Mother     Stroke Father     Hypertension Father     Hypertension Sister     Cancer Sister     Macular degeneration Neg Hx     Retinal detachment Neg Hx     Strabismus Neg Hx     Thyroid disease Neg Hx     Glaucoma Neg Hx     Diabetes Neg Hx     Cataracts Neg Hx     Amblyopia Neg Hx     Blindness Neg Hx        Social History     Socioeconomic History    Marital status:    Tobacco Use    Smoking status: Former     Packs/day: 1.00     Years: 25.00     Pack years: 25.00     Types: Cigarettes     Quit date:      Years since quittin.0    Smokeless tobacco: Never   Substance and Sexual  Activity    Alcohol use: Not Currently     Alcohol/week: 2.0 standard drinks     Types: 2 Glasses of wine per week    Drug use: No       Physical exam:  There were no vitals filed for this visit.  Body mass index is 28.06 kg/m².  General: In no apparent distress; well developed and well nourished.  HEENT: normocephalic; atraumatic.  Cardiovascular: regular rate.  Respiratory: no increased work of breathing.  Musculoskeletal:   Gait:  Mild antalgic  Inspection:  Diffuse swelling and ecchymosis involving the left ankle with extension both proximally into the lower leg and distally into the dorsum of the foot.  No medial-sided symptoms.  Moderate tenderness about the lateral malleolus with deep palpation.  Little to no pain with external rotation stress.  Negative Orozco testing.  Silfverskiold:  Negative  Alignment:  Knee: neutral               Ankle: neutral              Hindfoot: neutral              Forefoot: neutral   Strength:              Dorsiflexion 5/5  Plantar flexion 5/5  Inversion 5/5   Eversion 5/5   Sensation:              SILT distally   ROM:              Ankle:  Near full with pain at extremes              Subtalar: Painless inversion and eversion   Pulses: 2+ DP/PT pulses.                   Imaging Studies/Outside documentation:  I have ordered/reviewed/interpreted the following images/outside documentation:  1. Weight bearing 3-views of Left ankle:  Repeat weight-bearing stress x-rays of the left ankle show a minimally displaced spiral fracture of the lateral malleolus.  No evidence of clear space widening.  Congruent ankle mortise.  Significant calcific changes involving the dorsalis pedis and posterior tibial arteries.        Assessment:  Williams Gonzalez JrJami is a 89 y.o. male with Minimally displaced Left lateral malleolus fracture.     Plan:   Clinical and radiographic findings were discussed.  Stable ankle fracture on weight-bearing stress x-rays today.  Recommend continued conservative  management.  Patient may weightbear as tolerated in a tall cam boot.  May remove for sleep, hygiene, home ankle range of motion exercises.  Assistive ambulatory device at all times.  Patient voiced understanding.  All questions were answered.  Return to clinic in 1 month for repeat evaluation.    This note was created using voice recognition software and may contain grammatical errors.

## 2022-12-28 ENCOUNTER — TELEPHONE (OUTPATIENT)
Dept: ORTHOPEDICS | Facility: CLINIC | Age: 87
End: 2022-12-28
Payer: MEDICARE

## 2022-12-28 NOTE — TELEPHONE ENCOUNTER
----- Message from Emiliano Barry sent at 12/28/2022  9:11 AM CST -----  Regarding: question about the boot, call pt   Contact: pt   question about the boot, call pt

## 2022-12-28 NOTE — TELEPHONE ENCOUNTER
Called and spoke with patient regarding his concern for the boot.  He stated that the air pump was not filling up he felt.  He spoke with DME and they explained instructions to him.  Patient understood and agreed.

## 2023-01-30 DIAGNOSIS — M25.572 ACUTE LEFT ANKLE PAIN: Primary | ICD-10-CM

## 2023-01-31 ENCOUNTER — OFFICE VISIT (OUTPATIENT)
Dept: ORTHOPEDICS | Facility: CLINIC | Age: 88
End: 2023-01-31
Payer: MEDICARE

## 2023-01-31 ENCOUNTER — HOSPITAL ENCOUNTER (OUTPATIENT)
Dept: RADIOLOGY | Facility: HOSPITAL | Age: 88
Discharge: HOME | End: 2023-01-31
Attending: ORTHOPAEDIC SURGERY
Payer: MEDICARE

## 2023-01-31 VITALS — HEIGHT: 69 IN | BODY MASS INDEX: 28.14 KG/M2 | WEIGHT: 190 LBS

## 2023-01-31 DIAGNOSIS — S82.62XA DISPLACED FRACTURE OF LATERAL MALLEOLUS OF LEFT FIBULA, INITIAL ENCOUNTER FOR CLOSED FRACTURE: Primary | ICD-10-CM

## 2023-01-31 DIAGNOSIS — M25.572 ACUTE LEFT ANKLE PAIN: ICD-10-CM

## 2023-01-31 PROCEDURE — 73610 X-RAY EXAM OF ANKLE: CPT | Mod: 26,LT,, | Performed by: RADIOLOGY

## 2023-01-31 PROCEDURE — 1160F PR REVIEW ALL MEDS BY PRESCRIBER/CLIN PHARMACIST DOCUMENTED: ICD-10-PCS | Mod: CPTII,S$GLB,, | Performed by: ORTHOPAEDIC SURGERY

## 2023-01-31 PROCEDURE — 73610 X-RAY EXAM OF ANKLE: CPT | Mod: TC,PO,LT

## 2023-01-31 PROCEDURE — 99999 PR PBB SHADOW E&M-EST. PATIENT-LVL III: CPT | Mod: PBBFAC,,, | Performed by: ORTHOPAEDIC SURGERY

## 2023-01-31 PROCEDURE — 1160F RVW MEDS BY RX/DR IN RCRD: CPT | Mod: CPTII,S$GLB,, | Performed by: ORTHOPAEDIC SURGERY

## 2023-01-31 PROCEDURE — 3288F FALL RISK ASSESSMENT DOCD: CPT | Mod: CPTII,S$GLB,, | Performed by: ORTHOPAEDIC SURGERY

## 2023-01-31 PROCEDURE — 73610 XR ANKLE COMPLETE 3 VIEW LEFT: ICD-10-PCS | Mod: 26,LT,, | Performed by: RADIOLOGY

## 2023-01-31 PROCEDURE — 1101F PR PT FALLS ASSESS DOC 0-1 FALLS W/OUT INJ PAST YR: ICD-10-PCS | Mod: CPTII,S$GLB,, | Performed by: ORTHOPAEDIC SURGERY

## 2023-01-31 PROCEDURE — 1159F MED LIST DOCD IN RCRD: CPT | Mod: CPTII,S$GLB,, | Performed by: ORTHOPAEDIC SURGERY

## 2023-01-31 PROCEDURE — 3288F PR FALLS RISK ASSESSMENT DOCUMENTED: ICD-10-PCS | Mod: CPTII,S$GLB,, | Performed by: ORTHOPAEDIC SURGERY

## 2023-01-31 PROCEDURE — 99999 PR PBB SHADOW E&M-EST. PATIENT-LVL III: ICD-10-PCS | Mod: PBBFAC,,, | Performed by: ORTHOPAEDIC SURGERY

## 2023-01-31 PROCEDURE — 99213 OFFICE O/P EST LOW 20 MIN: CPT | Mod: S$GLB,,, | Performed by: ORTHOPAEDIC SURGERY

## 2023-01-31 PROCEDURE — 1126F PR PAIN SEVERITY QUANTIFIED, NO PAIN PRESENT: ICD-10-PCS | Mod: CPTII,S$GLB,, | Performed by: ORTHOPAEDIC SURGERY

## 2023-01-31 PROCEDURE — 99213 PR OFFICE/OUTPT VISIT, EST, LEVL III, 20-29 MIN: ICD-10-PCS | Mod: S$GLB,,, | Performed by: ORTHOPAEDIC SURGERY

## 2023-01-31 PROCEDURE — 1101F PT FALLS ASSESS-DOCD LE1/YR: CPT | Mod: CPTII,S$GLB,, | Performed by: ORTHOPAEDIC SURGERY

## 2023-01-31 PROCEDURE — 1159F PR MEDICATION LIST DOCUMENTED IN MEDICAL RECORD: ICD-10-PCS | Mod: CPTII,S$GLB,, | Performed by: ORTHOPAEDIC SURGERY

## 2023-01-31 PROCEDURE — 1126F AMNT PAIN NOTED NONE PRSNT: CPT | Mod: CPTII,S$GLB,, | Performed by: ORTHOPAEDIC SURGERY

## 2023-03-13 DIAGNOSIS — S82.62XA DISPLACED FRACTURE OF LATERAL MALLEOLUS OF LEFT FIBULA, INITIAL ENCOUNTER FOR CLOSED FRACTURE: Primary | ICD-10-CM

## 2023-03-14 ENCOUNTER — HOSPITAL ENCOUNTER (OUTPATIENT)
Dept: RADIOLOGY | Facility: HOSPITAL | Age: 88
Discharge: HOME | End: 2023-03-14
Attending: ORTHOPAEDIC SURGERY
Payer: MEDICARE

## 2023-03-14 ENCOUNTER — OFFICE VISIT (OUTPATIENT)
Dept: ORTHOPEDICS | Facility: CLINIC | Age: 88
End: 2023-03-14
Payer: MEDICARE

## 2023-03-14 DIAGNOSIS — S82.62XA DISPLACED FRACTURE OF LATERAL MALLEOLUS OF LEFT FIBULA, INITIAL ENCOUNTER FOR CLOSED FRACTURE: ICD-10-CM

## 2023-03-14 DIAGNOSIS — S82.62XA DISPLACED FRACTURE OF LATERAL MALLEOLUS OF LEFT FIBULA, INITIAL ENCOUNTER FOR CLOSED FRACTURE: Primary | ICD-10-CM

## 2023-03-14 PROCEDURE — 73610 X-RAY EXAM OF ANKLE: CPT | Mod: 26,LT,, | Performed by: RADIOLOGY

## 2023-03-14 PROCEDURE — 1126F AMNT PAIN NOTED NONE PRSNT: CPT | Mod: CPTII,S$GLB,, | Performed by: ORTHOPAEDIC SURGERY

## 2023-03-14 PROCEDURE — 99213 PR OFFICE/OUTPT VISIT, EST, LEVL III, 20-29 MIN: ICD-10-PCS | Mod: S$GLB,,, | Performed by: ORTHOPAEDIC SURGERY

## 2023-03-14 PROCEDURE — 1160F RVW MEDS BY RX/DR IN RCRD: CPT | Mod: CPTII,S$GLB,, | Performed by: ORTHOPAEDIC SURGERY

## 2023-03-14 PROCEDURE — 1159F PR MEDICATION LIST DOCUMENTED IN MEDICAL RECORD: ICD-10-PCS | Mod: CPTII,S$GLB,, | Performed by: ORTHOPAEDIC SURGERY

## 2023-03-14 PROCEDURE — 99999 PR PBB SHADOW E&M-EST. PATIENT-LVL II: ICD-10-PCS | Mod: PBBFAC,,, | Performed by: ORTHOPAEDIC SURGERY

## 2023-03-14 PROCEDURE — 99213 OFFICE O/P EST LOW 20 MIN: CPT | Mod: S$GLB,,, | Performed by: ORTHOPAEDIC SURGERY

## 2023-03-14 PROCEDURE — 1159F MED LIST DOCD IN RCRD: CPT | Mod: CPTII,S$GLB,, | Performed by: ORTHOPAEDIC SURGERY

## 2023-03-14 PROCEDURE — 73610 XR ANKLE COMPLETE 3 VIEW LEFT: ICD-10-PCS | Mod: 26,LT,, | Performed by: RADIOLOGY

## 2023-03-14 PROCEDURE — 73610 X-RAY EXAM OF ANKLE: CPT | Mod: TC,PO,LT

## 2023-03-14 PROCEDURE — 1160F PR REVIEW ALL MEDS BY PRESCRIBER/CLIN PHARMACIST DOCUMENTED: ICD-10-PCS | Mod: CPTII,S$GLB,, | Performed by: ORTHOPAEDIC SURGERY

## 2023-03-14 PROCEDURE — 1126F PR PAIN SEVERITY QUANTIFIED, NO PAIN PRESENT: ICD-10-PCS | Mod: CPTII,S$GLB,, | Performed by: ORTHOPAEDIC SURGERY

## 2023-03-14 PROCEDURE — 99999 PR PBB SHADOW E&M-EST. PATIENT-LVL II: CPT | Mod: PBBFAC,,, | Performed by: ORTHOPAEDIC SURGERY

## 2023-03-14 NOTE — PROGRESS NOTES
Status/Diagnosis: Minimally displaced Left lateral malleolus fracture  Date of Surgery: none  Date of Injury: 12/19/2022  Return visit: PRN  X-rays on Return: pending patient complaint    Chief Complaint:   Chief Complaint   Patient presents with    Left Ankle - Pain     Present History:  Williams Gonzalez Jr. is a 89 y.o. male who returns today for continued follow-up.  Overall doing well with no complaints of pain.  Currently wearing the ASO lace-up ankle brace only as needed.  Denies any new injuries.  Denies any numbness or tingling.  PMH significant for Afib on Xarelto.      Past Medical History:   Diagnosis Date    Anticoagulant long-term use     Cataract     OU done//    Hypertension     Melanoma in situ of right upper arm 2018    PAF (paroxysmal atrial fibrillation)     Prostate cancer 07/1999    Sleep apnea     does not use CPAP        Past Surgical History:   Procedure Laterality Date    APPENDECTOMY      CARDIOVERSION      CARDIOVERSION N/A 12/5/2022    Procedure: CARDIOVERSION;  Surgeon: Angel Choe MD;  Location: [facility];  Service: Cardiology;  Laterality: N/A;    CAROTID ARTERY ANGIOPLASTY      left    CATARACT EXTRACTION Left 10/23/2014    CATARACT EXTRACTION W/  INTRAOCULAR LENS IMPLANT Right 12/10/2014    COLONOSCOPY      CORONARY ANGIOGRAPHY N/A 09/11/2020    Procedure: ANGIOGRAM, CORONARY ARTERY;  Surgeon: Kia Fong MD;  Location: [facility];  Service: Cardiology;  Laterality: N/A;    ECTROPION REPAIR Bilateral     ou    LEFT HEART CATHETERIZATION Left 09/11/2020    Procedure: Left heart cath;  Surgeon: Kia Fong MD;  Location: [facility];  Service: Cardiology;  Laterality: Left;    NASAL SEPTUM SURGERY      VASECTOMY      sedation    yag cap Bilateral      OU done//       Current Outpatient Medications   Medication Sig    allopurinol (ZYLOPRIM) 300 MG tablet Take 300 mg by mouth once daily.    candesartan (ATACAND) 32 MG tablet Take 32 mg by mouth every evening.    gabapentin (NEURONTIN)  600 MG tablet Take 600 mg by mouth every evening.    HYDROcodone-acetaminophen (NORCO) 5-325 mg per tablet Take 1 tablet by mouth every 4 (four) hours as needed.    levothyroxine (SYNTHROID) 25 MCG tablet Take 25 mcg by mouth before breakfast.     multivitamin (THERAGRAN) per tablet Take 1 tablet by mouth once daily.     pantoprazole (PROTONIX) 40 MG tablet Take 1 tablet (40 mg total) by mouth 2 (two) times daily before meals.    pravastatin (PRAVACHOL) 40 MG tablet TAKE 1 TABLET BY MOUTH ONCE DAILY    rivaroxaban (XARELTO) 20 mg Tab Take 20 mg by mouth daily with dinner or evening meal.    sotaloL (BETAPACE) 80 MG tablet TAKE 1 TABLET BY MOUTH  TWICE DAILY (Patient taking differently: Take 120 mg by mouth 2 (two) times daily.)     No current facility-administered medications for this visit.       Review of patient's allergies indicates:   Allergen Reactions    Codeine Itching    Irbesartan     Lotrel [amlodipine-benazepril]        Family History   Problem Relation Age of Onset    Hypertension Brother     Cancer Brother     Hypertension Mother     Stroke Father     Hypertension Father     Hypertension Sister     Cancer Sister     Macular degeneration Neg Hx     Retinal detachment Neg Hx     Strabismus Neg Hx     Thyroid disease Neg Hx     Glaucoma Neg Hx     Diabetes Neg Hx     Cataracts Neg Hx     Amblyopia Neg Hx     Blindness Neg Hx        Social History     Socioeconomic History    Marital status:    Tobacco Use    Smoking status: Former     Packs/day: 1.00     Years: 25.00     Pack years: 25.00     Types: Cigarettes     Quit date:      Years since quittin.2    Smokeless tobacco: Never   Substance and Sexual Activity    Alcohol use: Not Currently     Alcohol/week: 2.0 standard drinks     Types: 2 Glasses of wine per week    Drug use: No       Physical exam:  There were no vitals filed for this visit.  There is no height or weight on file to calculate BMI.  General: In no apparent distress; well  developed and well nourished.  HEENT: normocephalic; atraumatic.  Cardiovascular: regular rate.  Respiratory: no increased work of breathing.  Musculoskeletal:   Gait:  nonantalgic  Inspection:  no residual swelling or ecchymosis about the left ankle.  No tenderness to palpation at the lateral malleolar fracture site.  No pain with external rotation stress.  Negative Orozco testing.  Silfverskiold:  Negative  Alignment:  Knee: neutral               Ankle: neutral              Hindfoot: neutral              Forefoot: neutral   Strength:              Dorsiflexion 5/5  Plantar flexion 5/5  Inversion 5/5   Eversion 5/5   Sensation:              SILT distally   ROM:              Ankle:  Full painless              Subtalar: Painless inversion and eversion   Pulses: 2+ DP/PT pulses.                   Imaging Studies/Outside documentation:  I have ordered/reviewed/interpreted the following images/outside documentation:  1. Weight bearing 3-views of Left ankle:  persistent minimally displaced spiral fracture of the lateral malleolus.  No evidence of clear space widening.  Congruent ankle mortise.  Continued interval callus formation and bony bridging present.  Again note significant calcific changes involving the dorsalis pedis and posterior tibial arteries.        Assessment:  Williams Londonjenny Celis is a 89 y.o. male with Minimally displaced Left lateral malleolus fracture.     Plan:   Clinical and radiographic findings were discussed.  Patient continues to do well with conservative management.  May begin to phase out a lace-up ankle brace at this time.  Given instructions to refrain from any high impact activities-running, jumping, etc..  Patient voiced understanding.  All questions were answered.  He will follow up on an as-needed basis.      This note was created using voice recognition software and may contain grammatical errors.

## 2023-11-02 PROBLEM — N17.9 ACUTE KIDNEY FAILURE, UNSPECIFIED: Status: ACTIVE | Noted: 2023-11-02

## 2024-02-05 PROBLEM — N17.9 ACUTE KIDNEY FAILURE, UNSPECIFIED: Status: RESOLVED | Noted: 2023-11-02 | Resolved: 2024-02-05
